# Patient Record
Sex: MALE | Race: WHITE | NOT HISPANIC OR LATINO
[De-identification: names, ages, dates, MRNs, and addresses within clinical notes are randomized per-mention and may not be internally consistent; named-entity substitution may affect disease eponyms.]

---

## 2022-07-11 PROBLEM — Z00.00 ENCOUNTER FOR PREVENTIVE HEALTH EXAMINATION: Status: ACTIVE | Noted: 2022-07-11

## 2022-07-13 ENCOUNTER — APPOINTMENT (OUTPATIENT)
Dept: ORTHOPEDIC SURGERY | Facility: CLINIC | Age: 62
End: 2022-07-13

## 2022-07-13 VITALS — BODY MASS INDEX: 38.94 KG/M2 | WEIGHT: 272 LBS | HEIGHT: 70 IN

## 2022-07-13 DIAGNOSIS — I10 ESSENTIAL (PRIMARY) HYPERTENSION: ICD-10-CM

## 2022-07-13 DIAGNOSIS — H34.9 UNSPECIFIED RETINAL VASCULAR OCCLUSION: ICD-10-CM

## 2022-07-13 DIAGNOSIS — Z86.69 PERSONAL HISTORY OF OTHER DISEASES OF THE NERVOUS SYSTEM AND SENSE ORGANS: ICD-10-CM

## 2022-07-13 DIAGNOSIS — Z87.39 PERSONAL HISTORY OF OTHER DISEASES OF THE MUSCULOSKELETAL SYSTEM AND CONNECTIVE TISSUE: ICD-10-CM

## 2022-07-13 PROCEDURE — 99203 OFFICE O/P NEW LOW 30 MIN: CPT | Mod: 25

## 2022-07-13 PROCEDURE — 73130 X-RAY EXAM OF HAND: CPT | Mod: LT

## 2022-07-13 PROCEDURE — 20550 NJX 1 TENDON SHEATH/LIGAMENT: CPT

## 2022-07-13 RX ORDER — ALLOPURINOL 300 MG/1
300 TABLET ORAL
Qty: 90 | Refills: 0 | Status: ACTIVE | COMMUNITY
Start: 2022-05-10

## 2022-07-13 RX ORDER — LOSARTAN POTASSIUM 50 MG/1
50 TABLET, FILM COATED ORAL
Qty: 90 | Refills: 0 | Status: ACTIVE | COMMUNITY
Start: 2022-05-10

## 2022-07-13 RX ORDER — METOPROLOL SUCCINATE 100 MG/1
100 TABLET, EXTENDED RELEASE ORAL
Qty: 180 | Refills: 0 | Status: ACTIVE | COMMUNITY
Start: 2022-05-10

## 2022-07-13 RX ORDER — CHLORTHALIDONE 25 MG/1
25 TABLET ORAL
Qty: 90 | Refills: 0 | Status: ACTIVE | COMMUNITY
Start: 2021-11-30

## 2022-07-13 RX ORDER — CHLORHEXIDINE GLUCONATE 4 %
325 (65 FE) LIQUID (ML) TOPICAL
Qty: 180 | Refills: 0 | Status: ACTIVE | COMMUNITY
Start: 2021-12-23

## 2022-07-13 RX ORDER — ATORVASTATIN CALCIUM 20 MG/1
20 TABLET, FILM COATED ORAL
Qty: 90 | Refills: 0 | Status: ACTIVE | COMMUNITY
Start: 2022-05-10

## 2022-07-13 RX ORDER — HYDROCHLOROTHIAZIDE 25 MG/1
25 TABLET ORAL
Qty: 90 | Refills: 0 | Status: ACTIVE | COMMUNITY
Start: 2022-05-12

## 2022-07-13 RX ORDER — AMLODIPINE BESYLATE 10 MG/1
10 TABLET ORAL
Qty: 90 | Refills: 0 | Status: ACTIVE | COMMUNITY
Start: 2022-05-10

## 2022-07-13 NOTE — ASSESSMENT
[FreeTextEntry1] : The condition was explained to the patient.\par Discussed risks and benefits of treatment options for tenosynovitis - activity modification, NSAID, splint, steroid injection, or surgery.\par \par Patient would like to proceed with CSI for trigger finger.\par - Discussed risks, benefits, and alternatives as well as contents of injection. Risks include, but are not limited allergic reaction, flare reaction, injection site pain, bruising, numbness, increased blood sugar, skin discoloration, fat atrophy, tendon rupture, and infection. Risk of immune suppression and increased susceptibility to infection with steroid use. We discussed that too many injections may lead to weakening of the tendon and tendon rupture. Patient expressed understanding and would like to proceed with injection.\par - The skin over the LEFT finger A1 pulley was cleansed with alcohol and anesthetized with ethyl chloride. The flexor sheath was injected with 3mg of celestone, 0.5cc of 1% lidocaine. Site was dressed with a band-aid. Patient tolerated the procedure well.\par - OTC pain medication, activity modification, ice PRN.\par - discussed that it may take up to 1 week for symptoms to improve after CSI.\par \par F/u PRN.

## 2022-07-13 NOTE — IMAGING
[de-identified] : LEFT HAND\par skin intact. no swelling.\par TTP to MF A1 pulley.\par good EPL, FPL. good finger extension, flex to full fist. good finger abduction and adduction. \par SILT median, ulnar, radial distributions.\par palpable radial pulse, brisk cap refill all digits.\par \par + triggering of MF.\par negative Tinel's at carpal tunnel.

## 2022-07-13 NOTE — HISTORY OF PRESENT ILLNESS
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 He has tried a splint to no relief. It is worse in the AM, and he wakes with the finger locked. \par \par Hx: retinal vein occlusion. HTN. LILIANA. gout. [] : no [FreeTextEntry1] : left hand  [FreeTextEntry4] : 3 weeks  [FreeTextEntry5] : pt believes he has trigger finger in middle finger  [de-identified] : movement

## 2022-09-14 ENCOUNTER — APPOINTMENT (OUTPATIENT)
Dept: ORTHOPEDIC SURGERY | Facility: CLINIC | Age: 62
End: 2022-09-14

## 2022-09-14 VITALS — HEIGHT: 70 IN | BODY MASS INDEX: 38.94 KG/M2 | WEIGHT: 272 LBS

## 2022-09-14 DIAGNOSIS — Z78.9 OTHER SPECIFIED HEALTH STATUS: ICD-10-CM

## 2022-09-14 PROCEDURE — 20550 NJX 1 TENDON SHEATH/LIGAMENT: CPT

## 2022-09-14 PROCEDURE — 99213 OFFICE O/P EST LOW 20 MIN: CPT | Mod: 25

## 2022-09-14 NOTE — REASON FOR VISIT
[FreeTextEntry2] : 09/14/2022 :CLAUDE BOUBACAR , a 62 year old male, presents today for left trigger finger \par

## 2022-09-14 NOTE — ASSESSMENT
[FreeTextEntry1] : Patient would like to repeat CSI for trigger finger.\par - Discussed risks, benefits, and alternatives as well as contents of injection. Risks include, but are not limited allergic reaction, flare reaction, injection site pain, bruising, numbness, increased blood sugar, skin discoloration, fat atrophy, tendon rupture, and infection. Risk of immune suppression and increased susceptibility to infection with steroid use. We discussed that too many injections may lead to weakening of the tendon and tendon rupture. Patient expressed understanding and would like to proceed with injection.\par - The skin over the LEFT finger A1 pulley was cleansed with alcohol and anesthetized with ethyl chloride. The flexor sheath was injected with 3mg of celestone, 0.5cc of 1% lidocaine. Site was dressed with a band-aid. Patient tolerated the procedure well.\par - OTC pain medication, activity modification, ice PRN.\par - discussed that it may take up to 1 week for symptoms to improve after CSI.\par \par F/u PRN.

## 2022-09-14 NOTE — HISTORY OF PRESENT ILLNESS
[Gradual] : gradual [3] : 3 [9] : 9 [Dull/Aching] : dull/aching [Localized] : localized [Sharp] : sharp [Intermittent] : intermittent [Full time] : Work status: full time [de-identified] : 9/14/22: f/u LEFT middle trigger finger. s/p CSI 7/13/22. 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 He has tried a splint to no relief. It is worse in the AM, and he wakes with the finger locked. \par \par Hx: retinal vein occlusion. HTN. LILIANA. gout. [] : no

## 2022-09-14 NOTE — IMAGING
[de-identified] : LEFT HAND\par skin intact. no swelling.\par TTP to MF A1 pulley.\par good EPL, FPL. good finger extension, flex to full fist. good finger abduction and adduction. \par SILT median, ulnar, radial distributions.\par palpable radial pulse, brisk cap refill all digits.\par \par + triggering of MF.\par negative Tinel's at carpal tunnel.

## 2022-10-04 ENCOUNTER — APPOINTMENT (OUTPATIENT)
Dept: ORTHOPEDIC SURGERY | Facility: CLINIC | Age: 62
End: 2022-10-04

## 2023-01-04 ENCOUNTER — APPOINTMENT (OUTPATIENT)
Dept: ORTHOPEDIC SURGERY | Facility: CLINIC | Age: 63
End: 2023-01-04
Payer: COMMERCIAL

## 2023-01-04 VITALS — BODY MASS INDEX: 38.94 KG/M2 | WEIGHT: 272 LBS | HEIGHT: 70 IN

## 2023-01-04 PROCEDURE — 99213 OFFICE O/P EST LOW 20 MIN: CPT

## 2023-01-04 NOTE — IMAGING
[de-identified] : LEFT HAND\par skin intact. no swelling.\par TTP to MF A1 pulley with overlying cyst.\par good EPL, FPL. good finger extension, flex to full fist. good finger abduction and adduction. \par SILT median, ulnar, radial distributions.\par palpable radial pulse, brisk cap refill all digits.\par + triggering of MF.\par negative Tinel's at carpal tunnel.

## 2023-01-04 NOTE — HISTORY OF PRESENT ILLNESS
[Gradual] : gradual [10] : 10 [2] : 2 [Dull/Aching] : dull/aching [Sharp] : sharp [Shooting] : shooting [Stabbing] : stabbing [Intermittent] : intermittent [Nothing helps with pain getting better] : Nothing helps with pain getting better [Full time] : Work status: full time [] : no [FreeTextEntry9] : g [de-identified] : grasping gripping and squeezing  [de-identified] : 9/14/22 [de-identified] : MERRICK Reed md [de-identified] : 1/4/23: f/u LEFT middle trigger finger. s/p CSI 7/13/22 and 9/14/22. pain and locking has returned.\par \par 9/14/22: f/u LEFT middle trigger finger. s/p CSI 7/13/22. symptoms resolved after injection, but returned ~3wks ago.\par \par 7/13/22: 63yo RHD  M (sleep medicine @[place]) with LEFT middle finger pain, locking, and clicking for the past few weeks with no injury. He has tried a splint to no relief. It is worse in the AM, and he wakes with the finger locked. \par \par Hx: retinal vein occlusion. HTN. LILIANA. gout.

## 2023-01-04 NOTE — ASSESSMENT
[FreeTextEntry1] : Discussed the risks and benefits of surgical vs non-surgical treatment. \par Discussed the risk of bleeding, infection - which may require antibiotics or surgical debridement - persistent pain, swelling, stiffness, loss of motion, weakness. Risk of injury to tendons, blood vessels, and nerves - which may cause loss of function. Risk of scar tenderness, hypersensitivity, cold sensitivity, and CRPS. Risk of recurrence. May require therapy post-operatively to optimize range of motion and function.\par Surgery also carries a risk of complications related to anesthesia.\par All patient questions were answered. Patient expressed understanding and would like to proceed with LEFT middle trigger finger release.\par \par F/u after surgery.

## 2023-02-08 ENCOUNTER — LABORATORY RESULT (OUTPATIENT)
Age: 63
End: 2023-02-08

## 2023-02-13 ENCOUNTER — APPOINTMENT (OUTPATIENT)
Age: 63
End: 2023-02-13
Payer: COMMERCIAL

## 2023-02-13 PROCEDURE — 26055 INCISE FINGER TENDON SHEATH: CPT | Mod: F2

## 2023-02-13 PROCEDURE — 26055 INCISE FINGER TENDON SHEATH: CPT | Mod: AS,F2

## 2023-02-14 ENCOUNTER — APPOINTMENT (OUTPATIENT)
Dept: ORTHOPEDIC SURGERY | Facility: CLINIC | Age: 63
End: 2023-02-14

## 2023-02-22 ENCOUNTER — APPOINTMENT (OUTPATIENT)
Dept: ORTHOPEDIC SURGERY | Facility: CLINIC | Age: 63
End: 2023-02-22
Payer: COMMERCIAL

## 2023-02-22 VITALS — WEIGHT: 272 LBS | HEIGHT: 70 IN | BODY MASS INDEX: 38.94 KG/M2

## 2023-02-22 DIAGNOSIS — M65.332 TRIGGER FINGER, LEFT MIDDLE FINGER: ICD-10-CM

## 2023-02-22 PROCEDURE — 99024 POSTOP FOLLOW-UP VISIT: CPT

## 2023-02-22 NOTE — ASSESSMENT
[FreeTextEntry1] : - sutures removed. ok to wash incision with soap and water in 24 hours.\par - continue HEP. will call for OT Rx if unable to make a full fist in 2 weeks.\par - light activity, advance as tolerated.\par \par F/u PRN.

## 2023-02-22 NOTE — IMAGING
[de-identified] : LEFT HAND\par incision clean, dry, and intact s/p MF TFR. sutures in place. no erythema or drainage.\par good EPL, FPL. good finger extension, IF/MF flex 1cm to DPC. good finger abduction and adduction. \par SILT median, ulnar, radial distributions.\par palpable radial pulse, brisk cap refill all digits.\par no triggering.

## 2023-02-22 NOTE — HISTORY OF PRESENT ILLNESS
[6] : 6 [de-identified] : 2/22/23: 9 days s/p LEFT middle trigger finger release on 2/13/23. pain well controlled. denies numbness/tingling. denies f/c or ill sx.\par \par 1/4/23: f/u LEFT middle trigger finger. s/p CSI 7/13/22 and 9/14/22. pain and locking has returned.\par \par 9/14/22: f/u LEFT middle trigger finger. s/p CSI 7/13/22. 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 He has tried a splint to no relief. It is worse in the AM, and he wakes with the finger locked. \par \par Hx: retinal vein occlusion. HTN. LILIANA. gout. [] : no [FreeTextEntry1] : left hand  [FreeTextEntry5] :  CLAUDE ALBERTARIO is a 63 year male who is here today for left hand post op visit.  [de-identified] : 02/13/23 [de-identified] : left middle finger trigger release

## 2023-03-28 ENCOUNTER — APPOINTMENT (OUTPATIENT)
Dept: ORTHOPEDIC SURGERY | Facility: CLINIC | Age: 63
End: 2023-03-28

## 2023-09-20 ENCOUNTER — APPOINTMENT (OUTPATIENT)
Dept: PAIN MANAGEMENT | Facility: CLINIC | Age: 63
End: 2023-09-20
Payer: COMMERCIAL

## 2023-09-20 VITALS — WEIGHT: 272 LBS | BODY MASS INDEX: 38.94 KG/M2 | HEIGHT: 70 IN

## 2023-09-20 DIAGNOSIS — Z87.891 PERSONAL HISTORY OF NICOTINE DEPENDENCE: ICD-10-CM

## 2023-09-20 DIAGNOSIS — Z78.9 OTHER SPECIFIED HEALTH STATUS: ICD-10-CM

## 2023-09-20 DIAGNOSIS — H93.13 TINNITUS, BILATERAL: ICD-10-CM

## 2023-09-20 PROCEDURE — 99204 OFFICE O/P NEW MOD 45 MIN: CPT

## 2024-05-01 ENCOUNTER — APPOINTMENT (OUTPATIENT)
Dept: ORTHOPEDIC SURGERY | Facility: CLINIC | Age: 64
End: 2024-05-01
Payer: COMMERCIAL

## 2024-05-01 DIAGNOSIS — M65.331 TRIGGER FINGER, RIGHT MIDDLE FINGER: ICD-10-CM

## 2024-05-01 PROCEDURE — 99213 OFFICE O/P EST LOW 20 MIN: CPT | Mod: 25

## 2024-05-01 PROCEDURE — 73140 X-RAY EXAM OF FINGER(S): CPT | Mod: RT

## 2024-05-01 PROCEDURE — 20550 NJX 1 TENDON SHEATH/LIGAMENT: CPT | Mod: RT

## 2024-05-01 NOTE — ASSESSMENT
[FreeTextEntry1] : The condition was explained to the patient.  Discussed risks and benefits of treatment options for tenosynovitis - activity modification, NSAID, splint, steroid injection, or surgery. Patient would like to proceed with CSI for trigger finger. - Discussed risks, benefits, and alternatives as well as contents of injection. Risks include, but are not limited allergic reaction, flare reaction, injection site pain, bruising, numbness, increased blood sugar, skin discoloration, fat atrophy, tendon rupture, and infection. Risk of immune suppression and increased susceptibility to infection with steroid use. We discussed that too many injections may lead to weakening of the tendon and tendon rupture. Patient expressed understanding and would like to proceed with injection. - The skin over the RIGHT middle finger A1 pulley was cleansed with alcohol and anesthetized with ethyl chloride. The flexor sheath was injected with 3mg of celestone, 0.5cc of 1% lidocaine. Site was dressed with a band-aid. Patient tolerated the procedure well. - discussed that it may take up to 1 week for symptoms to improve after CSI.

## 2024-05-01 NOTE — HISTORY OF PRESENT ILLNESS
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  Last seen 2/22/23 s/p LEFT middle trigger finger release on 2/13/23.  Hx: LILIANA. HTN. Gout.  [] : no [FreeTextEntry1] : RIGHT middle finger  [FreeTextEntry5] : CLAUDE ALBERTARIO a [RHD] 64 year old male is here today c/o RIGHT middle finger pain and locking x 3-4 months w/o injury. c/o locking in the PM, pain in knuckle. not taking anything for pain.

## 2024-05-01 NOTE — IMAGING
[de-identified] : RIGHT HAND skin intact. no swelling. TTP to MF A1 pulley. good EPL, FPL. good finger extension, flex to full fist. good finger abduction and adduction.  SILT median, ulnar, radial distributions. palpable radial pulse, brisk cap refill all digits. no triggering.   XRAYS OF RIGHT MIDDLE FINGER: no acute displaced fracture or dislocation.

## 2024-09-30 ENCOUNTER — APPOINTMENT (OUTPATIENT)
Dept: ORTHOPEDIC SURGERY | Facility: CLINIC | Age: 64
End: 2024-09-30
Payer: COMMERCIAL

## 2024-09-30 VITALS — HEIGHT: 70 IN | WEIGHT: 270 LBS | BODY MASS INDEX: 38.65 KG/M2

## 2024-09-30 DIAGNOSIS — M65.331 TRIGGER FINGER, RIGHT MIDDLE FINGER: ICD-10-CM

## 2024-09-30 PROCEDURE — 99213 OFFICE O/P EST LOW 20 MIN: CPT | Mod: 25

## 2024-09-30 PROCEDURE — 20550 NJX 1 TENDON SHEATH/LIGAMENT: CPT | Mod: RT

## 2024-09-30 NOTE — HISTORY OF PRESENT ILLNESS
[8] : 8 [Tightness] : tightness [Intermittent] : intermittent [Nothing helps with pain getting better] : Nothing helps with pain getting better [de-identified] : 9/28/24: f/u RIGHT middle trigger finger. s/p CSI on 5/1/24. reports symptoms resolved after injection, but pain and stiffness returned ~3 weeks ago.  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  Last seen 2/22/23 s/p LEFT middle trigger finger release on 2/13/23.  Hx: LILIANA. HTN. Gout.  [] : no [FreeTextEntry5] : his finger still hurts, he hears clicking in the morning,  [FreeTextEntry6] : stiffness  [de-identified] : none

## 2024-09-30 NOTE — IMAGING
[de-identified] : RIGHT HAND skin intact. no swelling. TTP to MF A1 pulley. good EPL, FPL. good finger extension, flex to full fist. good finger abduction and adduction.  SILT median, ulnar, radial distributions. palpable radial pulse, brisk cap refill all digits. no triggering.

## 2024-09-30 NOTE — ASSESSMENT
[FreeTextEntry1] : Reviewed risks and benefits of treatment options for tenosynovitis - activity modification, NSAID, splint, steroid injection, or surgery. Patient would like to repeat CSI for trigger finger. - Discussed risks, benefits, and alternatives as well as contents of injection. Risks include, but are not limited allergic reaction, flare reaction, injection site pain, bruising, numbness, increased blood sugar, skin discoloration, fat atrophy, tendon rupture, and infection. Risk of immune suppression and increased susceptibility to infection with steroid use. We discussed that too many injections may lead to weakening of the tendon and tendon rupture. Patient expressed understanding and would like to proceed with injection. - The skin over the RIGHT middle finger A1 pulley was cleansed with alcohol and anesthetized with ethyl chloride. The flexor sheath was injected with 3mg of celestone, 0.5cc of 1% lidocaine. Site was dressed with a band-aid. Patient tolerated the procedure well. - discussed that it may take up to 1 week for symptoms to improve after CSI.

## 2025-01-09 ENCOUNTER — APPOINTMENT (OUTPATIENT)
Dept: ORTHOPEDIC SURGERY | Facility: CLINIC | Age: 65
End: 2025-01-09
Payer: COMMERCIAL

## 2025-01-09 DIAGNOSIS — M65.331 TRIGGER FINGER, RIGHT MIDDLE FINGER: ICD-10-CM

## 2025-01-09 PROCEDURE — 99214 OFFICE O/P EST MOD 30 MIN: CPT

## 2025-02-28 ENCOUNTER — OUTPATIENT (OUTPATIENT)
Dept: OUTPATIENT SERVICES | Facility: HOSPITAL | Age: 65
LOS: 1 days | End: 2025-02-28
Payer: COMMERCIAL

## 2025-02-28 VITALS
HEART RATE: 56 BPM | HEIGHT: 70 IN | TEMPERATURE: 98 F | DIASTOLIC BLOOD PRESSURE: 82 MMHG | OXYGEN SATURATION: 96 % | WEIGHT: 272.05 LBS | SYSTOLIC BLOOD PRESSURE: 130 MMHG | RESPIRATION RATE: 17 BRPM

## 2025-02-28 DIAGNOSIS — I10 ESSENTIAL (PRIMARY) HYPERTENSION: ICD-10-CM

## 2025-02-28 DIAGNOSIS — Z90.89 ACQUIRED ABSENCE OF OTHER ORGANS: Chronic | ICD-10-CM

## 2025-02-28 DIAGNOSIS — Z98.890 OTHER SPECIFIED POSTPROCEDURAL STATES: Chronic | ICD-10-CM

## 2025-02-28 DIAGNOSIS — G47.33 OBSTRUCTIVE SLEEP APNEA (ADULT) (PEDIATRIC): ICD-10-CM

## 2025-02-28 DIAGNOSIS — M65.30 TRIGGER FINGER, UNSPECIFIED FINGER: ICD-10-CM

## 2025-02-28 DIAGNOSIS — Z90.49 ACQUIRED ABSENCE OF OTHER SPECIFIED PARTS OF DIGESTIVE TRACT: Chronic | ICD-10-CM

## 2025-02-28 PROCEDURE — 85027 COMPLETE CBC AUTOMATED: CPT

## 2025-02-28 PROCEDURE — 80048 BASIC METABOLIC PNL TOTAL CA: CPT

## 2025-02-28 PROCEDURE — G0463: CPT

## 2025-02-28 RX ORDER — AMLODIPINE BESYLATE 10 MG/1
1 TABLET ORAL
Refills: 0 | DISCHARGE

## 2025-02-28 RX ORDER — ALBUTEROL SULFATE 2.5 MG/3ML
2 VIAL, NEBULIZER (ML) INHALATION
Refills: 0 | DISCHARGE

## 2025-02-28 RX ORDER — ASPIRIN 325 MG
1 TABLET ORAL
Refills: 0 | DISCHARGE

## 2025-02-28 RX ORDER — METOPROLOL SUCCINATE 50 MG/1
1 TABLET, EXTENDED RELEASE ORAL
Refills: 0 | DISCHARGE

## 2025-02-28 RX ORDER — LOSARTAN POTASSIUM 100 MG/1
1 TABLET, FILM COATED ORAL
Refills: 0 | DISCHARGE

## 2025-02-28 NOTE — H&P PST ADULT - HISTORY OF PRESENT ILLNESS
flu 2024  COVID 2024 65 year old male with hx of Obesity, LILIANA on CPA, Benign Hypertension, trigger finger  right middle finger for trigger finger surgery.      + FLU 2/20/25 went to ER  [place] given Tamiflu  now +dry cough afebrile, no chills, shakes   vaccines   flu 2024  COVID 2024

## 2025-02-28 NOTE — H&P PST ADULT - NSICDXPASTMEDICALHX_GEN_ALL_CORE_FT
PAST MEDICAL HISTORY:  Benign hypertension     Gout     Influenza in adult     Kidney stones     Nasal drainage     Trigger finger, left middle finger     Trigger finger, right middle finger

## 2025-02-28 NOTE — H&P PST ADULT - ASSESSMENT
DASI: walk Mets 7  Symptoms : Denies SOB, DONOVAN, palpitations  Airway : no airway abnormalities , denies prior anesthesia complications   Mallampati : 4  Denies loose teeth     Corneal abrasion risk : Denies

## 2025-02-28 NOTE — H&P PST ADULT - PROBLEM SELECTOR PLAN 2
Left message for patient at      Telephone Information:   [facility] [phone]    to schedule procedure.  Patient to return call to Amanda [phone].   Sleep Apnea Protocol

## 2025-03-07 ENCOUNTER — APPOINTMENT (OUTPATIENT)
Dept: ORTHOPEDIC SURGERY | Facility: HOSPITAL | Age: 65
End: 2025-03-07

## 2025-03-12 ENCOUNTER — APPOINTMENT (OUTPATIENT)
Dept: ORTHOPEDIC SURGERY | Facility: CLINIC | Age: 65
End: 2025-03-12